# Patient Record
Sex: MALE | Race: WHITE | NOT HISPANIC OR LATINO | Employment: OTHER | URBAN - METROPOLITAN AREA
[De-identification: names, ages, dates, MRNs, and addresses within clinical notes are randomized per-mention and may not be internally consistent; named-entity substitution may affect disease eponyms.]

---

## 2023-08-31 ENCOUNTER — OFFICE VISIT (OUTPATIENT)
Dept: URGENT CARE | Facility: CLINIC | Age: 43
End: 2023-08-31
Payer: COMMERCIAL

## 2023-08-31 VITALS
WEIGHT: 170 LBS | RESPIRATION RATE: 18 BRPM | DIASTOLIC BLOOD PRESSURE: 87 MMHG | OXYGEN SATURATION: 99 % | TEMPERATURE: 97.5 F | HEART RATE: 81 BPM | SYSTOLIC BLOOD PRESSURE: 126 MMHG

## 2023-08-31 DIAGNOSIS — J06.9 ACUTE URI: Primary | ICD-10-CM

## 2023-08-31 LAB — S PYO AG THROAT QL: NEGATIVE

## 2023-08-31 PROCEDURE — 87880 STREP A ASSAY W/OPTIC: CPT | Performed by: PHYSICIAN ASSISTANT

## 2023-08-31 PROCEDURE — 99203 OFFICE O/P NEW LOW 30 MIN: CPT | Performed by: PHYSICIAN ASSISTANT

## 2023-08-31 RX ORDER — FEXOFENADINE HCL 180 MG/1
TABLET ORAL
COMMUNITY

## 2023-08-31 RX ORDER — IBUPROFEN 800 MG/1
TABLET ORAL
COMMUNITY
Start: 2023-07-10

## 2023-08-31 RX ORDER — DEXTROAMPHETAMINE SACCHARATE, AMPHETAMINE ASPARTATE, DEXTROAMPHETAMINE SULFATE AND AMPHETAMINE SULFATE 2.5; 2.5; 2.5; 2.5 MG/1; MG/1; MG/1; MG/1
1 TABLET ORAL 3 TIMES DAILY
COMMUNITY
Start: 2023-08-21

## 2023-08-31 NOTE — PROGRESS NOTES
[facility]        NAME: Yevgeniy Basilio is a 44 y.o. male  : 1980    MRN: [id number]  DATE: 2023  TIME: 11:27 AM      Assessment and Plan     Acute URI [J06.9]  1. Acute URI  POCT rapid strepA        Note:   Rapid strep negative. Told patient to take OTC meds such as DayQuil/NyQuil, mucinex, or robitussin. Told him that if there is a concern for COVID, to test himself on day 3-4 of symptoms, as it would probably be negative today. Patient Instructions   There are no Patient Instructions on file for this visit. Follow up with PCP in 3-5 days. Go to ER if symptoms worsen. Chief Complaint     Chief Complaint   Patient presents with   • Sore Throat     Sore throat and nasal congestion since yesterday. Daughter was dx with strep throat 4 days ago. Advil taken and didn't help with sympyoms         History of Present Illness     Patient presents with sore throat, nasal congestion, and chest congestion x yesterday. He took Advil with little relief. His daughter was diagnosed with strep 4 days ago. Review of Systems     Review of Systems   Constitutional: Negative for chills, fatigue and fever. HENT: Positive for congestion and sore throat. Negative for ear pain, postnasal drip, rhinorrhea, sinus pressure, sinus pain and sneezing. Eyes: Negative for pain and visual disturbance. Respiratory: Negative for cough and shortness of breath. Cardiovascular: Negative for chest pain and palpitations. Gastrointestinal: Negative for abdominal pain, diarrhea, nausea and vomiting. Genitourinary: Negative for dysuria and hematuria. Musculoskeletal: Negative for arthralgias, back pain and myalgias. Skin: Negative for rash. Neurological: Negative for dizziness, seizures, syncope, numbness and headaches. All other systems reviewed and are negative.         Current Medications       Current Outpatient Medications:   •  amphetamine-dextroamphetamine (ADDERALL) 10 mg tablet, Take 1 tablet by mouth 3 (three) times a day, Disp: , Rfl:   •  fexofenadine (Allegra Allergy) 180 MG tablet, Take by mouth, Disp: , Rfl:   •  ibuprofen (MOTRIN) 800 mg tablet, TAKE ORALLY 1 ORAL TABLET THREE TIMES A DAY FOR 20 DAYS. AS NEEDED FOR BACK PAIN, Disp: , Rfl:     Current Allergies     Allergies as of 08/31/2023   • (No Known Allergies)              The following portions of the patient's history were reviewed and updated as appropriate: allergies, current medications, past family history, past medical history, past social history, past surgical history, and problem list.     Past Medical History:   Diagnosis Date   • Anomaly of vertebrae 2021    Herniated Disk   • Hypercholesteremia        History reviewed. No pertinent surgical history. History reviewed. No pertinent family history. Medications have been verified. Objective     /87   Pulse 81   Temp 97.5 °F (36.4 °C)   Resp 18   Wt 77.1 kg (170 lb)   SpO2 99%   No LMP for male patient. Physical Exam     Physical Exam  Vitals and nursing note reviewed. Constitutional:       Appearance: He is well-developed and normal weight. HENT:      Head: Normocephalic and atraumatic. Nose: Congestion present. No rhinorrhea. Mouth/Throat:      Mouth: Mucous membranes are moist.      Pharynx: Posterior oropharyngeal erythema present. Tonsils: No tonsillar exudate. 0 on the right. 0 on the left. Cardiovascular:      Rate and Rhythm: Normal rate and regular rhythm. Heart sounds: Normal heart sounds. Pulmonary:      Effort: Pulmonary effort is normal.      Breath sounds: Normal breath sounds. Lymphadenopathy:      Cervical: No cervical adenopathy. Skin:     General: Skin is warm and dry. Neurological:      General: No focal deficit present. Mental Status: He is alert and oriented to person, place, and time.    Psychiatric:         Mood and Affect: Mood normal.         Behavior: Behavior normal.